# Patient Record
Sex: FEMALE | Race: BLACK OR AFRICAN AMERICAN | Employment: FULL TIME | URBAN - METROPOLITAN AREA
[De-identification: names, ages, dates, MRNs, and addresses within clinical notes are randomized per-mention and may not be internally consistent; named-entity substitution may affect disease eponyms.]

---

## 2017-01-26 ENCOUNTER — HOSPITAL ENCOUNTER (OUTPATIENT)
Dept: LAB | Age: 37
Discharge: HOME | End: 2017-01-26
Payer: COMMERCIAL

## 2017-01-26 PROCEDURE — 87624 HPV HI-RISK TYP POOLED RSLT: CPT | Performed by: OBSTETRICS & GYNECOLOGY

## 2017-01-26 PROCEDURE — 88175 CYTOPATH C/V AUTO FLUID REDO: CPT | Performed by: OBSTETRICS & GYNECOLOGY

## 2017-12-01 ENCOUNTER — OFFICE VISIT (OUTPATIENT)
Dept: SURGERY | Age: 37
End: 2017-12-01

## 2017-12-01 VITALS
DIASTOLIC BLOOD PRESSURE: 95 MMHG | WEIGHT: 293 LBS | OXYGEN SATURATION: 98 % | RESPIRATION RATE: 16 BRPM | HEART RATE: 109 BPM | SYSTOLIC BLOOD PRESSURE: 161 MMHG | HEIGHT: 70 IN | BODY MASS INDEX: 41.95 KG/M2 | TEMPERATURE: 99.3 F

## 2017-12-01 DIAGNOSIS — E66.01 OBESITY, MORBID (HCC): Primary | ICD-10-CM

## 2017-12-01 RX ORDER — HYDROCHLOROTHIAZIDE 25 MG/1
TABLET ORAL
Refills: 3 | COMMUNITY
Start: 2017-11-12

## 2017-12-01 RX ORDER — ERGOCALCIFEROL 1.25 MG/1
CAPSULE ORAL
Refills: 3 | COMMUNITY
Start: 2017-11-20

## 2017-12-01 RX ORDER — NORETHINDRONE ACETATE AND ETHINYL ESTRADIOL AND FERROUS FUMARATE 1MG-20(21)
KIT ORAL
Refills: 2 | COMMUNITY
Start: 2017-11-10

## 2017-12-01 NOTE — PROGRESS NOTES
Initial Consultation for Bariatric Surgery     Yoan Muñoz is a 39 y.o. female who comes into the office today for initial consultation for the surgical options for the treatment of morbid obesity. The patient initially identified obesity in childhood. Yoan Muñoz has tried a variety of unsupervised weight-loss attempts including unsupervised diets, but has yet to meet with lasting success. Maximum weight lost on a diet is about 0 lbs, but that the weight always seems to return. Today, the patient is Height: 5' 10\" (177.8 cm) , Weight: (!) 176.9 kg (390 lb) for a BMI of Body mass index is 55.96 kg/(m^2). Britany Prince Maximum weight is 395lbs. It is due to their severe obesity, which is further complicated by  hypertension and hypercholesterolemia that the patient is now seeking out bariatric surgery. She notes that she doesn't have much energy. She falls asleep shortly after returning home from work. She works from [employer] to 2:30pm.  She notes that she eats fast food, but is trying to cut it down. She doesn't cook a lot because she is the only person at home. She doesn't really like to exercise, but she is willing to start. She notes that once she gets into the habit, it goes well. Weight Loss Metrics 12/1/2017 12/1/2017 8/28/2014 3/6/2014 1/25/2012   Pre op / Initial Wt 390 - - - -   Today's Wt - 390 lb 346 lb 12.8 oz 360 lb 6.4 oz 407 lb   BMI - 55.96 kg/m2 49.76 kg/m2 51.71 kg/m2 58.40 kg/m2   Ideal Body Wt 149 - - - -   Excess Body Wt 241 - - - -   Goal Wt 197.2 - - - -   Wt loss to date 0 - - - -   % Wt Loss 0 - - - -   80% .8 - - - -       Body mass index is 55.96 kg/(m^2). Past Medical History:   Diagnosis Date    Headache(784.0) 3/6/2014    Hypertension        No past surgical history on file.     Current Outpatient Prescriptions   Medication Sig Dispense Refill    JUNEL FE 1/20, 28, 1 mg-20 mcg (21)/75 mg (7) tab TAKE 1 TABLET(S) EVERY DAY BY ORAL ROUTE.  2    hydroCHLOROthiazide (HYDRODIURIL) 25 mg tablet TAKE 1 TABLET BY MOUTH EVERY DAY  3    ergocalciferol (ERGOCALCIFEROL) 50,000 unit capsule TAKE 1 CAPSULE BY MOUTH WEEKLY  3    norethin-e.estradiol triphasic (ORTHO NOVUM) 0.5/1/0.5-35 mg-mcg tablet Take 1 Tab by mouth daily. Allergies   Allergen Reactions    Shellfish Derived Hives       Social History   Substance Use Topics    Smoking status: Never Smoker    Smokeless tobacco: Never Used    Alcohol use No       Family History   Problem Relation Age of Onset    Diabetes Mother     Hypertension Father     Cancer Father      prostate    Breast Cancer Paternal Aunt     Breast Cancer Paternal Aunt     Breast Cancer Paternal Aunt        ROS, positive where in bold:    General: fevers, chills, night sweats, fatigue, weight loss, weight gain    GI: abdominal pain, nausea, vomiting, change in bowel habits, hematochezia, melena, GERD, constipation    Integumentary: dermatitis or abnormal moles.     HEENT:  visual changes, vertigo, epistaxis, dysphagia, hoarseness    Cardiac: chest pain, palpitations, hypertension, edema, varicosities    Resp:  cough, shortness of breath, wheezing, hemoptysis, snoring, reactive airway disease    : hematuria, dysuria, frequency, urgency, nocturia, stress urinary incontinence    MSK: weakness, joint pain, arthritis    Endocrine: diabetes, thyroid disease, polyuria, polydipsia, polyphagia, poor wound healing, heat intolerance,cold intolerance    Lymph/Heme: anemia, bruising, history of blood transfusions    Neuro: dizziness, headache, fainting, seizures, stroke    Psychiatry:  anxiety, depression, psychosis      Physical Exam:  Visit Vitals    BP (!) 161/95    Pulse (!) 109    Temp 99.3 °F (37.4 °C) (Oral)    Resp 16    Ht 5' 10\" (1.778 m)    Wt (!) 176.9 kg (390 lb)    LMP 11/17/2017 (Approximate)    SpO2 98%    BMI 55.96 kg/m2       General: Well developed, well nourished 39 y.o. female in no acute distress  ENMT: normocephalic, atraumatic mouth:clear, no overt lesions, oral mucosa pink and moist  Neck: supple, no masses, no adenopathy or carotid bruits, trachea midline  Skin: warm, smooth, dry and well perfused  Respiratory: clear to auscultation bilaterally, no wheeze, rhonchi or rales, excursions normal and symmetrical  Cardiovascular: Regular rate and rhythm, no murmurs, clicks, gallops or rubs, no edema or varicosities  Gastrointestinal: soft, nontender, nondistended, normoactive bowel sounds, no hernias, no hepatosplenomegaly, easily palpable costal margins, mixed distribution  Musculoskeletal: warm, well-perfused, no tenderness or swelling, normal gait/station  Neuro: sensation and strength grossly intact and symmetrical  Psych: alert and oriented to person, place and time      Impression:    Tamar Lagos is a 39 y.o. female who is suffering from morbid obesity and its attendant comorbidities who would benefit from bariatric surgery. We have had an extensive discussion with regard to the risks, benefits and likely outcomes of both the sleeve and the gastric bypass. With regard to bypass, we have discussed the risks including bleeding, infection, need for reoperation, damage to surrounding structure, anastomotic leak, gastrogastric fistula ulcer and stricture, bowel obstruction due to internal hernia or adhesions, DVT, PE, heart attack, stroke and death. Patient also understands risks of inadequate weight loss, excess weight loss, vitamin insufficiency, protein malnutrition, excess skin, and loss of hair. We've discussed the restrictive nature of the sleeve gastrectomy. The patient understands the likelihood of losing approximately 65% of their excess weight in 12 to 18 months.  Patient also understands the risks including but not limited to bleeding, infection, need for reoperation, leaks from staple line and strictures, bowel obstruction secondary to adhesions, DVT, PE, heart attack, stroke, and death. Patient also understands risks of inadequate weight loss, excess weight loss, vitamin insufficiency, protein malnutrition, excess skin, and loss of hair. We have reviewed the components of a successful postoperative course including requirement for a high protein, low carbohydrate diet, 60 oz a day of zero calorie liquids, daily vitamin supplementation, daily exercise, regular follow-up, and participation in support groups. At this time we will enroll the patient in our bariatric program, undertake routine laboratory and radiographic evaluation, EKG, evaluation by nutritionist as well as psychologist and pending their satisfactory completion of the preop evaluation, plan to perform either a sleeve gastrectomy.

## 2017-12-04 LAB
PLEASE NOTE:, 188601: NORMAL
UREA BREATH TEST QL: NEGATIVE
UREA BREATH TEST QL: NORMAL

## 2017-12-15 ENCOUNTER — HOSPITAL ENCOUNTER (OUTPATIENT)
Dept: GENERAL RADIOLOGY | Age: 37
Discharge: HOME | End: 2017-12-15
Attending: SURGERY
Payer: COMMERCIAL

## 2017-12-15 ENCOUNTER — HOSPITAL ENCOUNTER (OUTPATIENT)
Dept: LAB | Age: 37
Discharge: HOME | End: 2017-12-15
Attending: SURGERY
Payer: COMMERCIAL

## 2017-12-15 DIAGNOSIS — E66.01 OBESITY, MORBID (HCC): ICD-10-CM

## 2017-12-15 LAB
25(OH)D3 SERPL-MCNC: 40.2 NG/ML (ref 30–100)
ALBUMIN SERPL-MCNC: 3.5 G/DL (ref 3.4–5)
ALBUMIN/GLOB SERPL: 0.8 {RATIO} (ref 0.8–1.7)
ALP SERPL-CCNC: 73 U/L (ref 45–117)
ALT SERPL-CCNC: 48 U/L (ref 13–56)
ANION GAP SERPL CALC-SCNC: 8 MMOL/L (ref 3–18)
AST SERPL-CCNC: 24 U/L (ref 15–37)
BASOPHILS # BLD: 0 K/UL (ref 0–0.06)
BASOPHILS NFR BLD: 0 % (ref 0–2)
BILIRUB SERPL-MCNC: 0.7 MG/DL (ref 0.2–1)
BUN SERPL-MCNC: 9 MG/DL (ref 7–18)
BUN/CREAT SERPL: 9 (ref 12–20)
CALCIUM SERPL-MCNC: 9 MG/DL (ref 8.5–10.1)
CHLORIDE SERPL-SCNC: 103 MMOL/L (ref 100–108)
CHOLEST SERPL-MCNC: 208 MG/DL
CO2 SERPL-SCNC: 27 MMOL/L (ref 21–32)
CREAT SERPL-MCNC: 1.05 MG/DL (ref 0.6–1.3)
DIFFERENTIAL METHOD BLD: ABNORMAL
EOSINOPHIL # BLD: 0.1 K/UL (ref 0–0.4)
EOSINOPHIL NFR BLD: 1 % (ref 0–5)
ERYTHROCYTE [DISTWIDTH] IN BLOOD BY AUTOMATED COUNT: 12.2 % (ref 11.6–14.5)
FERRITIN SERPL-MCNC: 80 NG/ML (ref 8–388)
FOLATE SERPL-MCNC: 7.3 NG/ML (ref 3.1–17.5)
GLOBULIN SER CALC-MCNC: 4.3 G/DL (ref 2–4)
GLUCOSE SERPL-MCNC: 86 MG/DL (ref 74–99)
HBA1C MFR BLD: 5.5 % (ref 4.2–5.6)
HCT VFR BLD AUTO: 37.7 % (ref 35–45)
HDLC SERPL-MCNC: 55 MG/DL (ref 40–60)
HDLC SERPL: 3.8 {RATIO} (ref 0–5)
HGB BLD-MCNC: 12.9 G/DL (ref 12–16)
IRON SERPL-MCNC: 84 UG/DL (ref 50–175)
LDLC SERPL CALC-MCNC: 137.6 MG/DL (ref 0–100)
LIPID PROFILE,FLP: ABNORMAL
LYMPHOCYTES # BLD: 1.8 K/UL (ref 0.9–3.6)
LYMPHOCYTES NFR BLD: 31 % (ref 21–52)
MCH RBC QN AUTO: 32.1 PG (ref 24–34)
MCHC RBC AUTO-ENTMCNC: 34.2 G/DL (ref 31–37)
MCV RBC AUTO: 93.8 FL (ref 74–97)
MONOCYTES # BLD: 0.4 K/UL (ref 0.05–1.2)
MONOCYTES NFR BLD: 7 % (ref 3–10)
NEUTS SEG # BLD: 3.6 K/UL (ref 1.8–8)
NEUTS SEG NFR BLD: 61 % (ref 40–73)
PLATELET # BLD AUTO: 455 K/UL (ref 135–420)
PMV BLD AUTO: 9.3 FL (ref 9.2–11.8)
POTASSIUM SERPL-SCNC: 4.2 MMOL/L (ref 3.5–5.5)
PROT SERPL-MCNC: 7.8 G/DL (ref 6.4–8.2)
RBC # BLD AUTO: 4.02 M/UL (ref 4.2–5.3)
SODIUM SERPL-SCNC: 138 MMOL/L (ref 136–145)
TRIGL SERPL-MCNC: 77 MG/DL (ref ?–150)
TSH SERPL DL<=0.05 MIU/L-ACNC: 2.21 UIU/ML (ref 0.36–3.74)
VIT B12 SERPL-MCNC: 351 PG/ML (ref 211–911)
VLDLC SERPL CALC-MCNC: 15.4 MG/DL
WBC # BLD AUTO: 5.9 K/UL (ref 4.6–13.2)

## 2017-12-15 PROCEDURE — 74011000250 HC RX REV CODE- 250: Performed by: SURGERY

## 2017-12-15 PROCEDURE — 82607 VITAMIN B-12: CPT | Performed by: SURGERY

## 2017-12-15 PROCEDURE — 82306 VITAMIN D 25 HYDROXY: CPT | Performed by: SURGERY

## 2017-12-15 PROCEDURE — 85025 COMPLETE CBC W/AUTO DIFF WBC: CPT | Performed by: SURGERY

## 2017-12-15 PROCEDURE — 80061 LIPID PANEL: CPT | Performed by: SURGERY

## 2017-12-15 PROCEDURE — 36415 COLL VENOUS BLD VENIPUNCTURE: CPT | Performed by: SURGERY

## 2017-12-15 PROCEDURE — 84425 ASSAY OF VITAMIN B-1: CPT | Performed by: SURGERY

## 2017-12-15 PROCEDURE — 74011000255 HC RX REV CODE- 255: Performed by: SURGERY

## 2017-12-15 PROCEDURE — 83036 HEMOGLOBIN GLYCOSYLATED A1C: CPT | Performed by: SURGERY

## 2017-12-15 PROCEDURE — 83540 ASSAY OF IRON: CPT | Performed by: SURGERY

## 2017-12-15 PROCEDURE — 74247 XR UPPER GI W KUB AIR CONT: CPT

## 2017-12-15 PROCEDURE — 82728 ASSAY OF FERRITIN: CPT | Performed by: SURGERY

## 2017-12-15 PROCEDURE — 84443 ASSAY THYROID STIM HORMONE: CPT | Performed by: SURGERY

## 2017-12-15 PROCEDURE — 80053 COMPREHEN METABOLIC PANEL: CPT | Performed by: SURGERY

## 2017-12-15 RX ADMIN — BARIUM SULFATE 176 G: 960 POWDER, FOR SUSPENSION ORAL at 08:41

## 2017-12-15 RX ADMIN — BARIUM SULFATE 135 ML: 980 POWDER, FOR SUSPENSION ORAL at 08:31

## 2017-12-15 RX ADMIN — ANTACID/ANTIFLATULENT 4 G: 380; 550; 10; 10 GRANULE, EFFERVESCENT ORAL at 08:31

## 2017-12-15 NOTE — PROGRESS NOTES
Given degree of reflux on UGI, she may benefit from bypass as opposed to sleeve.  Attempted to call her to discuss, left voicemail

## 2017-12-18 LAB — VIT B1 BLD-SCNC: 78.1 NMOL/L (ref 66.5–200)

## 2017-12-19 ENCOUNTER — TELEPHONE (OUTPATIENT)
Dept: SURGERY | Age: 37
End: 2017-12-19

## 2017-12-19 NOTE — TELEPHONE ENCOUNTER
----- Message from Yohana Herbert MD sent at 12/15/2017  3:38 PM EST -----  Given degree of reflux on UGI, she may benefit from bypass as opposed to sleeve.  Attempted to call her to discuss, left voicemail

## 2017-12-19 NOTE — TELEPHONE ENCOUNTER
Called patient to inform her of the results of the UGI. Patient stated she attempted to contact Dr. Diana Jorge back but no one was able to assist on why the phone call was made. Inform patient of Dr. Diana Jorge reasoning for calling and told the patient that I will send Dr. Diana Jorge a message to give her a call. Inform patient that Dr. Diana Jorge is in surgery today and clinic tomorrow but I will make sure Dr. Diana Jorge receives the message. Patient thanked me for following up and call ended.

## 2017-12-21 ENCOUNTER — DOCUMENTATION ONLY (OUTPATIENT)
Dept: SURGERY | Age: 37
End: 2017-12-21

## 2017-12-21 NOTE — PROGRESS NOTES
[facility]  [address] [facility], [address] [place]    Patient's Name: Taco Mora                                  Age: 40 y.o. YOB: 1980                                          Sex: female  Date: 12/20017  Insurance: [facility]                          Session: 1 of 6               Surgeon:  Dr. Jass Abbasi    Height: 5'1\"                    Weight: 377#           Starting weight with surgeon: 390#      Overall Pounds Lost: 13#                    Do you smoke?: no    Alcohol Intake:     Number of drinks at a time:  1-2  Number of times a week: a month. Class Guidelines    Pt. Understand that if they miss a month, depending on insurance, they may have to start over. Pt. Also understand that the expectation is to lose  Or maintain weight. Weight gain may result in delaying surgery until the weight is off. EATING HABITS AND BEHAVIORS:  Pt. Struggles With:  Liquid calories:   Skipping breakfast: x  Eating foods with a high amount of carbohydrates:   Eating High fat foods: x  Eating large portions: x  Making poor snack choices:  Eating out frequently: x  Skipping meals: x  Reading labels: x  Drinking >48 oz fluids daily: x  Getting sufficient physical activity/exercise: x  Night time eating/snacking:   Binge eating:   Eating often, even when not hungry (grazing):   Giving into peer pressure regarding eating/drinking:   Other:     Each class we spend time discussing the pre-op diet, diet progression and vitamin/mineral requirements as well as the Key Diet Principles. Each class we also cover lowering carbohydrate consumption. Keeping carbohydrates <25 grams per meal and avoiding added sugars is emphasized. Patient is educated on the effects that  carbohydrates and bad fats have on them.       PHYSICAL ACTIVITY/EXERCISE:   Patient's activity is increasing because patient plans to or is:  Walking more: x  Other aerobic activity such as running, swimming, Yee, kickboxing ect.:   Chair exercises: Active in resistance training such as weight lifting:   Other:     Each class we spend time discussing the importance of increasing activity. Patient can start with 10 minutes of walking daily or chair exercises and build from there. BEHAVIOR MODIFICATION:  Making modifications to behavior, patient plans to or is:  Eating only when hungry not to treat stress, anger, tiredness or boredom: x  Eating away from TV, computer and phone: x  Eating on a small plate: x  Eats slowly, chewing food well: x  Other: We spend time in each class discussing the importance of breaking bad habits and how to do this. I encourage pt.s to self evaluate and look for those crucial moments in which they are making these poor choices. I recommend a food journal which can help identify when/where//why/who we are with when we compromise and make exceptions that are poor choices. ADDITIONAL INFORMATION:  Specific changes patient made since the last class:  1st class. Pt. Has started to cut out fried food, fast food, sodas. She is eating more fresh vegetables.     Macey Morataya, JUSTEN  12/20/2017

## 2018-01-29 ENCOUNTER — DOCUMENTATION ONLY (OUTPATIENT)
Dept: SURGERY | Age: 38
End: 2018-01-29

## 2018-01-29 NOTE — PROGRESS NOTES
[facility]  [address] [facility], [address] [place]    Patient's Name: Eugenia Solomon                                  Age: 40 y.o. YOB: 1980                                          Sex: female  Date: 01/24/2018  [facility]                          Session: 2 of 6               Surgeon:  Dr. Ryan Prescott    Height: 5'10\"                    Weight: 362#           Starting weight with surgeon: 390#          Do you smoke?: no    Alcohol Intake:    Number of drinks at a time:  1-2  Number of times a month. Class Guidelines    Pt. Understand that if they miss a month, depending on insurance, they may have to start over. Pt. Also understand that the expectation is to lose  Or maintain weight. Weight gain may result in delaying surgery until the weight is off. EATING HABITS AND BEHAVIORS:  Pt. Struggles With:  Liquid calories:   Skipping breakfast:   Eating foods with a high amount of carbohydrates:   Eating High fat foods:   Eating large portions:   Making poor snack choices:  Eating out frequently:   Skipping meals: x  Reading labels:   Drinking >48 oz fluids daily: x  Getting sufficient physical activity/exercise:   Night time eating/snacking:   Binge eating:   Eating often, even when not hungry (grazing):   Giving into peer pressure regarding eating/drinking:   Other:     Each class we spend time discussing the pre-op diet, diet progression and vitamin/mineral requirements as well as the Key Diet Principles. Each class we also cover lowering carbohydrate consumption. Keeping carbohydrates <25 grams per meal and avoiding added sugars is emphasized. Patient is educated on the effects that  carbohydrates and bad fats have on them.       PHYSICAL ACTIVITY/EXERCISE:   Patient's activity is increasing because patient plans to or is:  Walking more: x  Other aerobic activity such as running, swimming, Yee, kickboxing ect.:   Chair exercises: Active in resistance training such as weight lifting:   Other:     Each class we spend time discussing the importance of increasing activity. Patient can start with 10 minutes of walking daily or chair exercises and build from there. BEHAVIOR MODIFICATION:  Making modifications to behavior, patient plans to or is:  Eating only when hungry not to treat stress, anger, tiredness or boredom: x  Eating away from TV, computer and phone: x  Eating on a small plate: x  Eats slowly, chewing food well: x  Other: We spend time in each class discussing the importance of breaking bad habits and how to do this. I encourage pt.s to self evaluate and look for those crucial moments in which they are making these poor choices. I recommend a food journal which can help identify when/where//why/who we are with when we compromise and make exceptions that are poor choices. ADDITIONAL INFORMATION:  Specific changes patient made since the last class:  Started walking. Exercise at least 30 mins per day. Patient is doing very well. RD noticed a 28# weight loss since her 1st meeting with surgeon on December 1st, 2017.     Leslie Ferreira RD  01/24/2018

## 2018-02-08 ENCOUNTER — OFFICE VISIT (OUTPATIENT)
Dept: SURGERY | Age: 38
End: 2018-02-08

## 2018-02-08 VITALS
OXYGEN SATURATION: 97 % | HEIGHT: 70 IN | SYSTOLIC BLOOD PRESSURE: 125 MMHG | WEIGHT: 293 LBS | DIASTOLIC BLOOD PRESSURE: 81 MMHG | BODY MASS INDEX: 41.95 KG/M2 | TEMPERATURE: 98.9 F | RESPIRATION RATE: 16 BRPM | HEART RATE: 103 BPM

## 2018-02-08 DIAGNOSIS — E66.01 MORBID OBESITY DUE TO EXCESS CALORIES (HCC): Primary | ICD-10-CM

## 2018-02-08 NOTE — PROGRESS NOTES
Bariatric Preoperative Progress note:    Subjective:     Armani Hudson is a 40 y.o. female who presents today for followup of their candidacy for bariatric surgery. Since last seen, has been exercising 30-60 minutes daily. She is drinking water and cooking at home. She is not eating out at all. She has lost more than 30lbs! Past Medical History:   Diagnosis Date    Headache(784.0) 3/6/2014    Hypertension        History reviewed. No pertinent surgical history. Current Outpatient Prescriptions   Medication Sig Dispense Refill    JUNEL FE 1/20, 28, 1 mg-20 mcg (21)/75 mg (7) tab TAKE 1 TABLET(S) EVERY DAY BY ORAL ROUTE.  2    hydroCHLOROthiazide (HYDRODIURIL) 25 mg tablet TAKE 1 TABLET BY MOUTH EVERY DAY  3    ergocalciferol (ERGOCALCIFEROL) 50,000 unit capsule TAKE 1 CAPSULE BY MOUTH WEEKLY  3    norethin-e.estradiol triphasic (ORTHO NOVUM) 0.5/1/0.5-35 mg-mcg tablet Take 1 Tab by mouth daily.          Allergies   Allergen Reactions    Shellfish Derived Hives         Objective:     Physical Exam:  Visit Vitals    /81 (BP 1 Location: Left arm, BP Patient Position: Sitting)    Pulse (!) 103    Temp 98.9 °F (37.2 °C) (Oral)    Resp 16    Ht 5' 10\" (1.778 m)    Wt (!) 162.2 kg (357 lb 9.6 oz)    LMP 02/07/2018 (Exact Date)    SpO2 97%    BMI 51.31 kg/m2       General: Well developed, well nourished 40 y.o. female in no acute distress  ENMT: normocephalic, atraumatic mouth:clear, no overt lesions, oral mucosa pink and moist  Neck: supple, no masses, no adenopathy or carotid bruits, trachea midline  Skin: warm, smooth, dry and well perfused  Respiratory: clear to auscultation bilaterally, no wheezes, rhonchi or rales, excursions normal and symmetrical  Cardiovascular: Regular rate and rhythm, no murmurs, clicks, gallops or rubs, no edema or varicosities  Gastrointestinal: soft, nontender, nondistended, normoactive bowel sounds, no hernias, no hepatosplenomegaly  Musculoskeletal: warm, well-perfused, no tenderness or swelling, normal gait/station  Neuro: sensation and strength grossly intact and symmetrical  Psych: alert and oriented to person, place and time      Studies to date:    Labs: significant for elevated cholesterol    UGI: reflux to upper esophagus    Nutritional evaluation: ongoing    Psychiatric evaluation:good candidate for surgery. Assessment:   Eugenia Solomon is a 40 y.o. female who is progressing through the bariatric preoperative evaluation. At this time, they are an appropriate candidate for weight loss surgery. Discussed degree of reflux on UGI and risk of worsening with sleeve gastrectomy. Patient does not wish to pursue gastric bypass, there fore, will proceed with sleeve.   Plan:   -complete remainder of preop evaluation including remaining dietary classes  -Follow up once has completed the above to determine next steps

## 2018-02-08 NOTE — PATIENT INSTRUCTIONS
If you have any questions or concerns about today's appointment, the verbal and/or written instructions you were given for follow up care, please call our office at [phone].     [facility] - [address]    [phone] office  [phone]

## 2018-02-08 NOTE — PROGRESS NOTES
1. Have you been to the ER, urgent care clinic since your last visit? Hospitalized since your last visit? No    2. Have you seen or consulted any other health care providers outside of the [facility] since your last visit? Include any pap smears or colon screening.  No     Patient presents today for Midtrial.

## 2018-02-26 ENCOUNTER — DOCUMENTATION ONLY (OUTPATIENT)
Dept: SURGERY | Age: 38
End: 2018-02-26

## 2018-02-26 NOTE — PROGRESS NOTES
[facility]  [address] [facility]    Patient's Name: Glenn Cooper                                  Age: 40 y.o. YOB: 1980                                          Sex: female  Date: 02/21/2018  [facility]                          Session: 3 of 6               Surgeon:  Dr. Tomás Farooq    Height: 5'10\"                    Weight: 350#           Starting weight with surgeon: 390#          Do you smoke?: no    Alcohol Intake:     Number of drinks at a time:  1-2  Number of times a week: once/month. Class Guidelines    Pt. Understand that if they miss a month, depending on insurance, they may have to start over. Pt. Also understand that the expectation is to lose  Or maintain weight. Weight gain may result in delaying surgery until the weight is off. EATING HABITS AND BEHAVIORS:  Pt. Struggles With:  Liquid calories:   Skipping breakfast:   Eating foods with a high amount of carbohydrates:   Eating High fat foods:   Eating large portions:   Making poor snack choices:  Eating out frequently:   Skipping meals:   Reading labels:   Drinking >48 oz fluids daily: x  Getting sufficient physical activity/exercise:   Night time eating/snacking:   Binge eating:   Eating often, even when not hungry (grazing):   Giving into peer pressure regarding eating/drinking:   Other:     Each class we spend time discussing the pre-op diet, diet progression and vitamin/mineral requirements as well as the Key Diet Principles. Each class we also cover lowering carbohydrate consumption. Keeping carbohydrates <25 grams per meal and avoiding added sugars is emphasized. Patient is educated on the effects that  carbohydrates and bad fats have on them. PHYSICAL ACTIVITY/EXERCISE:   Patient's activity is increasing because patient plans to or is:  Walking more:    Other aerobic activity such as running, swimming, Yee, kickboxing ect.:   Chair exercises: Active in resistance training such as weight lifting:   Other:     Each class we spend time discussing the importance of increasing activity. Patient can start with 10 minutes of walking daily or chair exercises and build from there. BEHAVIOR MODIFICATION:  Making modifications to behavior, patient plans to or is:  Eating only when hungry not to treat stress, anger, tiredness or boredom:   Eating away from TV, computer and phone: x  Eating on a small plate:   Eats slowly, chewing food well: x  Other: We spend time in each class discussing the importance of breaking bad habits and how to do this. I encourage pt.s to self evaluate and look for those crucial moments in which they are making these poor choices. I recommend a food journal which can help identify when/where//why/who we are with when we compromise and make exceptions that are poor choices. ADDITIONAL INFORMATION:  Patient is doing fantastic. She is a great encouragement for the class as well.       Keara Holcomb, RD  02/21/2018

## 2018-03-20 ENCOUNTER — DOCUMENTATION ONLY (OUTPATIENT)
Dept: SURGERY | Age: 38
End: 2018-03-20

## 2018-03-20 NOTE — PROGRESS NOTES
[facility]  [address] [facility], [address] [place]    Patient's Name: Sri Gould                                  Age: 40 y.o. YOB: 1980                                          Sex: female  Date: 03/14/2018  [facility]                          Session: 4 of 6               Surgeon:  Dr. Joann Carrizales    Height: 5'10\"                    Weight: 341#           Starting weight with surgeon: 390#        Do you smoke?: no    Alcohol Intake:     Number of drinks at a time:  1-2  Number of times a week: month    Class Guidelines    Pt. Understand that if they miss a month, depending on insurance, they may have to start over. Pt. Also understand that the expectation is to lose  Or maintain weight. Weight gain may result in delaying surgery until the weight is off. EATING HABITS AND BEHAVIORS:  Pt. Struggles With:  Liquid calories:   Skipping breakfast:   Eating foods with a high amount of carbohydrates:   Eating High fat foods:   Eating large portions:   Making poor snack choices:  Eating out frequently:   Skipping meals: x  Reading labels:   Drinking >48 oz fluids daily: x  Getting sufficient physical activity/exercise:   Night time eating/snacking:   Binge eating:   Eating often, even when not hungry (grazing):   Giving into peer pressure regarding eating/drinking:   Other:     Each class we spend time discussing the pre-op diet, diet progression and vitamin/mineral requirements as well as the Key Diet Principles. Each class we also cover lowering carbohydrate consumption. Keeping carbohydrates <25 grams per meal and avoiding added sugars is emphasized. Patient is educated on the effects that  carbohydrates and bad fats have on them.       PHYSICAL ACTIVITY/EXERCISE:   Patient's activity is increasing because patient plans to or is:  Walking more: x  Other aerobic activity such as running, swimming, Yee, kickboxing ect.:   Chair exercises: Active in resistance training such as weight lifting:   Other:     Each class we spend time discussing the importance of increasing activity. Patient can start with 10 minutes of walking daily or chair exercises and build from there. BEHAVIOR MODIFICATION:  Making modifications to behavior, patient plans to or is:  Eating only when hungry not to treat stress, anger, tiredness or boredom: x  Eating away from TV, computer and phone: x  Eating on a small plate: x  Eats slowly, chewing food well: x  Other: We spend time in each class discussing the importance of breaking bad habits and how to do this. I encourage pt.s to self evaluate and look for those crucial moments in which they are making these poor choices. I recommend a food journal which can help identify when/where//why/who we are with when we compromise and make exceptions that are poor choices. ADDITIONAL INFORMATION:  Specific changes patient made since the last class:  None. I continue with low carb and increase in activity. RD's concerns/opinion's:  Patient is doing fantastic.     Silver Leung RD  03/14/2018

## 2018-03-30 ENCOUNTER — OFFICE VISIT (OUTPATIENT)
Dept: SURGERY | Age: 38
End: 2018-03-30

## 2018-03-30 VITALS
SYSTOLIC BLOOD PRESSURE: 131 MMHG | HEART RATE: 91 BPM | TEMPERATURE: 99.1 F | OXYGEN SATURATION: 100 % | DIASTOLIC BLOOD PRESSURE: 81 MMHG | BODY MASS INDEX: 41.95 KG/M2 | RESPIRATION RATE: 16 BRPM | HEIGHT: 70 IN | WEIGHT: 293 LBS

## 2018-03-30 DIAGNOSIS — E66.01 MORBID OBESITY DUE TO EXCESS CALORIES (HCC): Primary | ICD-10-CM

## 2018-03-30 NOTE — PATIENT INSTRUCTIONS
If you have any questions or concerns about today's appointment, the verbal and/or written instructions you were given for follow up care, please call our office at [phone]. [facility] - [address]    [phone] office  [phone]

## 2018-03-30 NOTE — PROGRESS NOTES
Bariatric Preoperative Progress note:    Subjective:     Tatianna Amador is a 40 y.o. female who presents today for followup of their candidacy for bariatric surgery. Since last seen, has lost 50lbs in preop classes! She has completely stopped all fast food, fried foods and soda and sweets. She feels well. She is exercising on her bike or walking for an hour a day. No past medical history    History reviewed. No pertinent surgical history.     Current Outpatient Prescriptions   Medication Sig Dispense Refill    JUNEL FE 1/20, 28, 1 mg-20 mcg (21)/75 mg (7) tab TAKE 1 TABLET(S) EVERY DAY BY ORAL ROUTE.  2    hydroCHLOROthiazide (HYDRODIURIL) 25 mg tablet TAKE 1 TABLET BY MOUTH EVERY DAY  3    ergocalciferol (ERGOCALCIFEROL) 50,000 unit capsule TAKE 1 CAPSULE BY MOUTH WEEKLY  3       Allergies   Allergen Reactions    Shellfish Derived Hives         Objective:     Physical Exam:  Visit Vitals    Resp 16    Ht 5' 10\" (1.778 m)    Wt 154.4 kg (340 lb 6.4 oz)    LMP 03/07/2018    BMI 48.84 kg/m2       General: Well developed, well nourished 40 y.o. female in no acute distress  ENMT: normocephalic, atraumatic mouth:clear, no overt lesions, oral mucosa pink and moist  Neck: supple, no masses, no adenopathy or carotid bruits, trachea midline  Skin: warm, smooth, dry and well perfused  Respiratory: clear to auscultation bilaterally, no wheezes, rhonchi or rales, excursions normal and symmetrical  Cardiovascular: Regular rate and rhythm, no murmurs, clicks, gallops or rubs, no edema or varicosities  Gastrointestinal: soft, nontender, nondistended, normoactive bowel sounds, no hernias, no hepatosplenomegaly  Musculoskeletal: warm, well-perfused, no tenderness or swelling, normal gait/station  Neuro: sensation and strength grossly intact and symmetrical  Psych: alert and oriented to person, place and time      Studies to date:    Labs: significant for elevated total cholesterol and LDL    Nutritional evaluation: ongoing    Psychiatric evaluation:good candidate for weight loss surgery    UGI: +reflux to upper esophagus        Assessment:   Damien Simons is a 40 y.o. female who is progressing through the bariatric preoperative evaluation. At this time, they are an appropriate candidate for weight loss surgery. She does not know if she would like to have a sleeve or a bypass. She has reservations about each and doesn't know which one is best for her. We have discussed the relative risks and benefits of each operation. She remains uncertain as to which is best for her. She was encouraged to complete her weight loss classes and to see me in 2 months and we will re-visit this discussion. Plan:   -as above    I spent more than 25 minutes with this patient, of which more than 50% was in counseling.

## 2018-03-30 NOTE — PROGRESS NOTES
Gisselle Herzog is a 40 y.o. female who presents today for a bariatric mid trial surgical evaluation to assess their progress towards having bariatric surgery. Patient's BMI today is Body mass index is 48.84 kg/(m^2). Andriy Bhatia

## 2018-03-30 NOTE — MR AVS SNAPSHOT
[address] [id number] 
[phone] Patient: Tyra James MRN: [id number] GCS:[id number] Visit Information Date & Time Provider Department Dept. Phone Encounter #  
 3/30/2018 11:45 AM MD Andrew Luna [facility] [phone] [id number] Your Appointments 4/25/2018  3:00 PM  
NUTRITION COUNSELING with [facility] DIETICIAN Archie Bassett ([facility]) Appt Note: 5 of 6  
 [address] [place] [address] [address] [place] [address] Preeti De Gasperi 88 [facility]  
  
    
 5/23/2018  2:15 PM  
Follow Up with MD Archie Luna ([facility]) Appt Note: 2 mon f/u. discuss final option for bariatric surgery [address] [place] [address] [address] [place] [address] Preeti De Gasperi 88 [facility]  
  
    
 5/23/2018  3:00 PM  
NUTRITION COUNSELING with [facility] DIETICIAN Archie Bassett ([facility]) Appt Note: 6 of 6  
 [address] [place] [address] [address] [id number]  
[phone] Upcoming Health Maintenance Date Due Influenza Age 5 to Adult 8/1/2017 PAP AKA CERVICAL CYTOLOGY 1/26/2020 DTaP/Tdap/Td series (2 - Td) 8/28/2024 Allergies as of 3/30/2018  Review Complete On: 3/30/2018 By: Ashanti Wood MD  
  
 Severity Noted Reaction Type Reactions Shellfish Derived  12/01/2017    Hives Current Immunizations  Never Reviewed Name Date Tdap 8/28/2014 Not reviewed this visit Vitals BP Pulse Temp Resp Height(growth percentile) Weight(growth percentile) 131/81 (BP 1 Location: Left arm, BP Patient Position: Sitting) 91 99.1 °F (37.3 °C) (Oral) 16 5' 10\" (1.778 m) 340 lb 6.4 oz (154.4 kg) LMP SpO2 BMI OB Status Smoking Status 03/07/2018 100% 48.84 kg/m2 Having regular periods Never Smoker Vitals History BMI and BSA Data Body Mass Index Body Surface Area  
 48.84 kg/m 2 2.76 m 2 Your Updated Medication List  
  
   
This list is accurate as of 3/30/18 12:12 PM.  Always use your most recent med list.  
  
  
  
  
 ergocalciferol 50,000 unit capsule Commonly known as:  ERGOCALCIFEROL  
TAKE 1 CAPSULE BY MOUTH WEEKLY  
  
 hydroCHLOROthiazide 25 mg tablet Commonly known as:  HYDRODIURIL  
TAKE 1 TABLET BY MOUTH EVERY DAY  
  
 JUNEL FE 1/20 (28) 1 mg-20 mcg (21)/75 mg (7) Tab Generic drug:  norethindrone-ethinyl estradiol TAKE 1 TABLET(S) EVERY DAY BY ORAL ROUTE. Patient Instructions If you have any questions or concerns about today's appointment, the verbal and/or written instructions you were given for follow up care, please call our office at [phone]. [facility] [place] [phone] office [phone] Introducing [facility]! Dear Rick Suarez: Thank you for requesting a Book[employer]! account. Our records indicate that you already have an active BookShout! account. You can access your account anytime at [email or web address] Did you know that you can access your hospital and ER discharge instructions at any time in BookShout!? You can also review all of your test results from your hospital stay or ER visit. Additional Information If you have questions, please visit the Frequently Asked Questions section of the Book[employer]! website at [email or web address] Remember, BookShout! is NOT to be used for urgent needs. For medical emergencies, dial 911. Now available from your iPhone and Android! Please provide this summary of care documentation to your next provider. Your primary care clinician is listed as WHITNEY DREW. If you have any questions after today's visit, please call [phone].

## 2018-04-30 ENCOUNTER — DOCUMENTATION ONLY (OUTPATIENT)
Dept: SURGERY | Age: 38
End: 2018-04-30

## 2018-04-30 NOTE — PROGRESS NOTES
[facility]  [address]    Patient's Name: Bharat Kelly                                  Age: 40 y.o. YOB: 1980                                          Sex: female  Date: 04/25/2018  Insurance: [facility]                          Session: 5 of 6               Surgeon:  Dr. Rosa Blackwood    Height: 5'10\"                    Weight: 328#           Starting weight with surgeon: 360#          Do you smoke?: no    Alcohol Intake:     I drink occasionally:x  Number of drinks at a time:  1-2  Number of times a week: a month    Class Guidelines    Pt. Understand that if they miss a month, depending on insurance, they may have to start over. Pt. Also understand that the expectation is to lose  Or maintain weight. Weight gain may result in delaying surgery until the weight is off. EATING HABITS AND BEHAVIORS:  Pt. Struggles With:  Liquid calories:   Skipping breakfast:   Eating foods with a high amount of carbohydrates:   Eating High fat foods:   Eating large portions:   Making poor snack choices:  Eating out frequently:   Skipping meals:   Reading labels:   Drinking >48 oz fluids daily:   Getting sufficient physical activity/exercise:   Night time eating/snacking:   Binge eating:   Eating often, even when not hungry (grazing):   Giving into peer pressure regarding eating/drinking:   Other:     Each class we spend time discussing the pre-op diet, diet progression and vitamin/mineral requirements as well as the Key Diet Principles. Each class we also cover lowering carbohydrate consumption. Keeping carbohydrates <25 grams per meal and avoiding added sugars is emphasized. Patient is educated on the effects that  carbohydrates and bad fats have on them.       PHYSICAL ACTIVITY/EXERCISE:   Patient's activity is increasing because patient plans to or is:  Walking more: x  Other aerobic activity such as running, swimming, Yee, kickboxing ect.: x  Chair exercises: x  Active in resistance training such as weight lifting: x  Other:     Each class we spend time discussing the importance of increasing activity. Patient can start with 10 minutes of walking daily or chair exercises and build from there. BEHAVIOR MODIFICATION:  Making modifications to behavior, patient plans to or is:  Eating only when hungry not to treat stress, anger, tiredness or boredom: x  Eating away from TV, computer and phone:   Eating on a small plate: x  Eats slowly, chewing food well: x  Other: We spend time in each class discussing the importance of breaking bad habits and how to do this. I encourage pt.s to self evaluate and look for those crucial moments in which they are making these poor choices. I recommend a food journal which can help identify when/where//why/who we are with when we compromise and make exceptions that are poor choices. ADDITIONAL INFORMATION:  Specific changes patient made since the last class:  none    RD's concerns:  Patient is doing fantastic.     Kwadwo Zamora RD  04/25/2018

## 2018-05-23 ENCOUNTER — DOCUMENTATION ONLY (OUTPATIENT)
Dept: SURGERY | Age: 38
End: 2018-05-23

## 2018-05-23 NOTE — PROGRESS NOTES
[facility]  [address]    Patient's Name: Joel Domingo                                  Age: 40 y.o. YOB: 1980                                          Sex: female  Date: 05/23/2017  Insurance: [facility]                          Session: 6 of 6               Surgeon:  Dr. Payton Mendoza    Height: 5'10\"                    Weight: 322#           Starting weight with surgeon: 390#      Weight lost:  68#    Do you smoke?: no    Alcohol Intake:   I do not drink at all:x      Class Guidelines    Pt. Understand that if they miss a month, depending on insurance, they may have to start over. Pt. Also understand that the expectation is to lose  Or maintain weight. Weight gain may result in delaying surgery until the weight is off. EATING HABITS AND BEHAVIORS:  Pt. Struggles With:  Liquid calories:   Skipping breakfast:   Eating foods with a high amount of carbohydrates:   Eating High fat foods:   Eating large portions:   Making poor snack choices:  Eating out frequently:   Skipping meals:   Reading labels:   Drinking >48 oz fluids daily:   Getting sufficient physical activity/exercise:   Night time eating/snacking:   Binge eating:   Eating often, even when not hungry (grazing):   Giving into peer pressure regarding eating/drinking:   Other:     Each class we spend time discussing the pre-op diet, diet progression and vitamin/mineral requirements as well as the Key Diet Principles. Each class we also cover lowering carbohydrate consumption. Keeping carbohydrates <25 grams per meal and avoiding added sugars is emphasized. Patient is educated on the effects that  carbohydrates and bad fats have on them.       PHYSICAL ACTIVITY/EXERCISE:   Patient's activity is increasing because patient plans to or is:  Walking more: x  Other aerobic activity such as running, swimming, Yee, kickboxing ect.:   Chair exercises: x  Active in resistance training such as weight lifting:   Other:     Each class we spend time discussing the importance of increasing activity. Patient can start with 10 minutes of walking daily or chair exercises and build from there. BEHAVIOR MODIFICATION:  Making modifications to behavior, patient plans to or is:  Eating only when hungry not to treat stress, anger, tiredness or boredom: x  Eating away from TV, computer and phone:   Eating on a small plate: x  Eats slowly, chewing food well: Other: We spend time in each class discussing the importance of breaking bad habits and how to do this. I encourage pt.s to self evaluate and look for those crucial moments in which they are making these poor choices. I recommend a food journal which can help identify when/where//why/who we are with when we compromise and make exceptions that are poor choices. ADDITIONAL INFORMATION:  Specific changes patient made since the last class:  none    RD's concerns/opinion's:  Patient has done fantastic. She shares with the class that she plans her meals weekly and does her best to stick to the plan. She shares that she has decreased her carb intake, eating out and is at the gym very often. Today she has been educated on the pre-op, post-op diet as well as the vitamin/mineral protocol. She is cleared to proceed from a nutritional viewpoint.     Renu Baeza RD  05/23/2018

## 2018-05-30 ENCOUNTER — OFFICE VISIT (OUTPATIENT)
Dept: SURGERY | Age: 38
End: 2018-05-30

## 2018-05-30 VITALS — WEIGHT: 293 LBS | TEMPERATURE: 98.3 F | RESPIRATION RATE: 20 BRPM | HEIGHT: 70 IN | BODY MASS INDEX: 41.95 KG/M2

## 2018-05-30 DIAGNOSIS — E66.01 MORBID OBESITY DUE TO EXCESS CALORIES (HCC): Primary | ICD-10-CM

## 2018-05-30 RX ORDER — NORETHINDRONE ACETATE AND ETHINYL ESTRADIOL 1MG-20(21)
KIT ORAL
COMMUNITY

## 2018-05-30 RX ORDER — ERGOCALCIFEROL 1.25 MG/1
CAPSULE ORAL
COMMUNITY

## 2018-05-30 RX ORDER — HYDROCHLOROTHIAZIDE 25 MG/1
TABLET ORAL
COMMUNITY

## 2018-05-30 NOTE — PROGRESS NOTES
Meek Fishman is a 40 y.o. female who presents today with   Chief Complaint   Patient presents with    Morbid Obesity        Body mass index is 45.63 kg/(m^2). 1. Have you been to the ER, urgent care clinic since your last visit? Hospitalized since your last visit? No    2. Have you seen or consulted any other health care providers outside of the [address] [place] [address] since your last visit? Include any pap smears or colon screening.  No

## 2018-05-30 NOTE — PROGRESS NOTES
Bariatric Preoperative Progress note:    Subjective:     Leeroy Randolph is a 40 y.o. female who presents today for followup of their candidacy for bariatric surgery. Since last seen, has been working with dietician. She is exercising daily and has given up soda and snacks. She is focusing on eating her vegetables and protein. She is off of all fried foods and sweets. She has lost 72lbs with these efforts! Past Medical History:   Diagnosis Date    Headache(784.0) 3/6/2014    Hypertension        No past surgical history on file. Current Outpatient Prescriptions   Medication Sig Dispense Refill    JUNEL FE 1/20, 28, 1 mg-20 mcg (21)/75 mg (7) tab TAKE 1 TABLET(S) EVERY DAY BY ORAL ROUTE.  2    hydroCHLOROthiazide (HYDRODIURIL) 25 mg tablet TAKE 1 TABLET BY MOUTH EVERY DAY  3    ergocalciferol (ERGOCALCIFEROL) 50,000 unit capsule TAKE 1 CAPSULE BY MOUTH WEEKLY  3    ergocalciferol (ERGOCALCIFEROL) 50,000 unit capsule TAKE 1 CAPSULE BY MOUTH WEEKLY      hydroCHLOROthiazide (HYDRODIURIL) 25 mg tablet TAKE 1 TABLET BY MOUTH EVERY DAY      norethindrone-ethinyl estradiol (JUNEL FE 1/20, 28,) 1 mg-20 mcg (21)/75 mg (7) tab TAKE 1 TABLET(S) EVERY DAY BY ORAL ROUTE.          Allergies   Allergen Reactions    Shellfish Derived Hives         Objective:     Physical Exam:  Visit Vitals    Temp 98.3 °F (36.8 °C) (Oral)    Resp 20    Ht 5' 10\" (1.778 m)    Wt 144.2 kg (318 lb)    BMI 45.63 kg/m2       General: Well developed, well nourished 40 y.o. female in no acute distress  ENMT: normocephalic, atraumatic mouth:clear, no overt lesions, oral mucosa pink and moist  Neck: supple, no masses, no adenopathy or carotid bruits, trachea midline  Skin: warm, smooth, dry and well perfused  Musculoskeletal: warm, well-perfused, no tenderness or swelling, normal gait/station  Neuro: sensation and strength grossly intact and symmetrical  Psych: alert and oriented to person, place and time      Studies to date:    Labs: significant for elevated LDL, total cholesterol    Nutritional evaluation: lost >70lbs    Psychiatric evaluation:good candidate for surgery    UGI:reflus to upper esophagus        Assessment:   Glendy Alston is a 40 y.o. female who is progressing through the bariatric preoperative evaluation. At this time, they are an appropriate candidate for weight loss surgery. Patient has been exceedingly successful with nonoperative weight loss. She does not wish to have surgery at this time as she feels that she has learned so much and is doing well on her own.  Advised patient that we are glad to take care of her and to contact our office if we can be of assistance or if she decides to pursue surgery    Plan:   -as above

## 2018-06-07 ENCOUNTER — HOSPITAL ENCOUNTER (OUTPATIENT)
Dept: ULTRASOUND IMAGING | Age: 38
Discharge: HOME | End: 2018-06-07
Attending: INTERNAL MEDICINE
Payer: COMMERCIAL

## 2018-06-07 DIAGNOSIS — R74.8 ABNORMAL LIVER ENZYMES: ICD-10-CM

## 2018-06-07 PROCEDURE — 76705 ECHO EXAM OF ABDOMEN: CPT

## 2020-09-24 ENCOUNTER — EMPLOYEE WELLNESS (OUTPATIENT)
Dept: OTHER | Facility: CLINIC | Age: 40
End: 2020-09-24

## 2020-09-24 LAB
CHOLEST SERPL-MCNC: 187 MG/DL
GLUCOSE SERPL-MCNC: 91 MG/DL (ref 74–99)
HDLC SERPL-MCNC: 65 MG/DL (ref 40–60)
LDLC SERPL CALC-MCNC: 108 MG/DL (ref 0–100)
TRIGL SERPL-MCNC: 70 MG/DL (ref ?–150)